# Patient Record
(demographics unavailable — no encounter records)

---

## 2024-11-06 NOTE — ASSESSMENT
[FreeTextEntry1] : 67-year-old male with BPH/LUTS and elevated PSA.  Reviewed and interpreted labs and discussed with the patient as noted above. Recent prescription for tamsulosin 0.8 mg nightly. Reviewed prostate MRI report and discussed with patient. -No findings on MRI suspicious for prostate tumor -PI-RADS 1 -Prostate volume 102 cc  Follow-up in 1 month to reassess and will plan to do uroflow and PVR at that time.

## 2024-11-06 NOTE — PHYSICAL EXAM
[Normal Appearance] : normal appearance [General Appearance - In No Acute Distress] : no acute distress [Well Groomed] : well groomed [] : no respiratory distress [Respiration, Rhythm And Depth] : normal respiratory rhythm and effort [Exaggerated Use Of Accessory Muscles For Inspiration] : no accessory muscle use [Normal Station and Gait] : the gait and station were normal for the patient's age [Oriented To Time, Place, And Person] : oriented to person, place, and time [Affect] : the affect was normal [Mood] : the mood was normal

## 2024-11-06 NOTE — HISTORY OF PRESENT ILLNESS
[FreeTextEntry1] : 67-year-old male with BPH/LUTS, elevated PSA following up.   From prior office visits patient described symptoms of urinary straining, having to double void, sensation of incomplete bladder emptying, nocturia 7-10x nightly, urinary stream intermittency. At his last visit about 1 month ago his tamsulosin was increased from 0.4 mg to 0.8 mg nightly.  However, he states the pharmacy did not receive the prescription and he did not want to double up on his medication as he was worried he may run out.  So, he continued to take 0.4 mg nightly.  He also described dysuria 1 month ago.  Urine studies were ordered to evaluate for UTI.  Labs reviewed and interpreted and discussed with the patient. -UA: Unremarkable. -Urine culture: No growth.  His uroflow and PVR from 10/02/2024: Uroflow -Peak flow rate: 8.1 mL/s -Voided volume: 150 mL   mL  Regarding his PSA: [facility] labs: -09/23/2024: PSA 4.7; percent free PSA 28% -08/23/2024: PSA 4.13  Prostate MRI report reviewed. Prostate MRI 10/18/2024: -No findings on MRI suspicious for prostate tumor -PI-RADS 1 -Prostate volume 102 cc  He is unsure if there is a family history of prostate cancer as he states his family did not regularly do health screenings.

## 2024-12-06 NOTE — HISTORY OF PRESENT ILLNESS
[FreeTextEntry1] : 67-year-old [facility] male with elevated PSA following up for BPH/LUTS.  He is taking tamsulosin 0.8 mg nightly Nocturia 1-2x now; previously 8-10x States since the dose increase the urinary flow is improved.  Feels he empties his bladder.  Urinary urgency and frequency improved as well.  Denies urinary straining, urinary stream intermittency.  Denies urinary frequency, dysuria, gross hematuria.   Uroflow today:  -peak flowrate: 9.3 mL/s -voided volume: 86 mL PVR 52 mL today  His uroflow and PVR from 10/02/2024: Uroflow -Peak flow rate: 8.1 mL/s -Voided volume: 150 mL  mL  PSA trend: [facility] labs: -09/23/2024: PSA 4.7; percent free PSA 28% -08/23/2024: PSA 4.13  Prostate MRI 10/18/2024: -No findings on MRI suspicious for prostate tumor -PI-RADS 1 -Prostate volume 102 cc  He is unsure if there is a family history of prostate cancer as he states his family did not regularly do health screenings.

## 2024-12-06 NOTE — END OF VISIT
[Time Spent: ___ minutes] : I have spent [unfilled] minutes of time on the encounter which excludes teaching and separately reported services. FEVER

## 2024-12-06 NOTE — ASSESSMENT
[FreeTextEntry1] : 67-year-old male with elevated PSA following up for BPH/LUTS.   PVR today shows improved bladder emptying. BPH/LUTS symptoms improved with the increased dose of tamsulosin. Refill for tamsulosin 0.8 mg nightly sent to pharmacy. He will be due for another PSA around 09/2025.  Follow-up in 6 months or sooner if needed.

## 2025-06-18 NOTE — ASSESSMENT
[FreeTextEntry1] : 67-year-old [facility] male with chronic condition of HTN, GERD, elevated PSA following up for BPH/LUTS.   PVR 45 mL today.  No issues with urination while on tamsulosin. Refilled tamsulosin 0.8 mg nightly. Ordered PSA for prostate cancer screening.  Follow up in 6 months or sooner if needed.

## 2025-06-18 NOTE — HISTORY OF PRESENT ILLNESS
[FreeTextEntry1] : 67-year-old [facility] male with chronic condition of HTN, GERD, elevated PSA following up for BPH/LUTS.   He takes tamsulosin 0.8 mg nightly. Reports he does not have issues with urination while on the medication.  Nocturia 1-2x nightly Endorses good urinary flow.  Denies dysuria, urinary straining, gross hematuria.   PVR 45 mL today  PSA profile: [facility] labs: -09/23/2024: PSA 4.7; percent free PSA 28% -08/23/2024: PSA 4.13  Prostate MRI 10/18/2024: -No findings on MRI suspicious for prostate tumor -PI-RADS 1 -Prostate volume 102 cc  He is unsure if there is a family history of prostate cancer as he states his family did not regularly do health screenings.